# Patient Record
Sex: MALE | Race: WHITE | NOT HISPANIC OR LATINO | Employment: OTHER | URBAN - METROPOLITAN AREA
[De-identification: names, ages, dates, MRNs, and addresses within clinical notes are randomized per-mention and may not be internally consistent; named-entity substitution may affect disease eponyms.]

---

## 2017-01-11 ENCOUNTER — PREPPED CHART (OUTPATIENT)
Dept: URBAN - METROPOLITAN AREA CLINIC 39 | Facility: CLINIC | Age: 77
End: 2017-01-11

## 2017-03-03 ASSESSMENT — VISUAL ACUITY
OS_SC: 20/400
OD_SC: 20/30-1

## 2017-03-03 ASSESSMENT — TONOMETRY: OD_IOP_MMHG: 14

## 2017-03-17 ENCOUNTER — ESTABLISHED PATIENT (OUTPATIENT)
Dept: URBAN - METROPOLITAN AREA CLINIC 43 | Facility: CLINIC | Age: 77
End: 2017-03-17

## 2017-03-17 DIAGNOSIS — H35.3221: ICD-10-CM

## 2017-03-17 DIAGNOSIS — H40.1132: ICD-10-CM

## 2017-03-17 DIAGNOSIS — H35.3211: ICD-10-CM

## 2017-03-17 DIAGNOSIS — Z96.1: ICD-10-CM

## 2017-03-17 PROCEDURE — G8428 CUR MEDS NOT DOCUMENT: HCPCS

## 2017-03-17 PROCEDURE — 4177F TALK PT/CRGVR RE AREDS PREV: CPT

## 2017-03-17 PROCEDURE — G8785 BP SCRN NO PERF AT INTERVAL: HCPCS

## 2017-03-17 PROCEDURE — 2019F DILATED MACUL EXAM DONE: CPT

## 2017-03-17 PROCEDURE — 0517F GLAUCOMA PLAN OF CARE DOCD: CPT

## 2017-03-17 PROCEDURE — 2027F OPTIC NERVE HEAD EVAL DONE: CPT

## 2017-03-17 PROCEDURE — 92134 CPTRZ OPH DX IMG PST SGM RTA: CPT

## 2017-03-17 PROCEDURE — 92012 INTRM OPH EXAM EST PATIENT: CPT

## 2017-03-17 PROCEDURE — 1036F TOBACCO NON-USER: CPT

## 2017-03-17 ASSESSMENT — VISUAL ACUITY
OD_SC: 20/25-1
OS_SC: 20/400

## 2017-03-17 ASSESSMENT — TONOMETRY
OD_IOP_MMHG: 14
OS_IOP_MMHG: 15

## 2017-03-29 ENCOUNTER — CLINIC PROCEDURE ONLY (OUTPATIENT)
Dept: URBAN - METROPOLITAN AREA CLINIC 43 | Facility: CLINIC | Age: 77
End: 2017-03-29

## 2017-03-29 DIAGNOSIS — H35.3211: ICD-10-CM

## 2017-03-29 PROCEDURE — J3490AVA AVASTIN

## 2017-03-29 PROCEDURE — 67028 INJECTION EYE DRUG: CPT

## 2017-03-29 ASSESSMENT — VISUAL ACUITY
OS_SC: 20/400
OD_SC: 20/25-2

## 2017-03-29 ASSESSMENT — TONOMETRY: OD_IOP_MMHG: 12

## 2017-05-15 ENCOUNTER — ESTABLISHED PATIENT (OUTPATIENT)
Dept: URBAN - METROPOLITAN AREA CLINIC 43 | Facility: CLINIC | Age: 77
End: 2017-05-15

## 2017-05-15 DIAGNOSIS — H35.3221: ICD-10-CM

## 2017-05-15 DIAGNOSIS — Z96.1: ICD-10-CM

## 2017-05-15 DIAGNOSIS — H40.1132: ICD-10-CM

## 2017-05-15 DIAGNOSIS — H35.3211: ICD-10-CM

## 2017-05-15 DIAGNOSIS — E11.3293: ICD-10-CM

## 2017-05-15 PROCEDURE — 92134 CPTRZ OPH DX IMG PST SGM RTA: CPT

## 2017-05-15 PROCEDURE — 4040F PNEUMOC VAC/ADMIN/RCVD: CPT

## 2017-05-15 PROCEDURE — G8482 FLU IMMUNIZE ORDER/ADMIN: HCPCS

## 2017-05-15 PROCEDURE — 2021F DILAT MACULAR EXAM DONE: CPT

## 2017-05-15 PROCEDURE — 92012 INTRM OPH EXAM EST PATIENT: CPT

## 2017-05-15 PROCEDURE — 0517F GLAUCOMA PLAN OF CARE DOCD: CPT

## 2017-05-15 PROCEDURE — 4177F TALK PT/CRGVR RE AREDS PREV: CPT

## 2017-05-15 PROCEDURE — 9222650 BILAT EXTENDED OPHTHALMOSCOPY, F/U

## 2017-05-15 PROCEDURE — 5010F MACUL RESULT PHY/QHP MNG DM: CPT

## 2017-05-15 PROCEDURE — 2019F DILATED MACUL EXAM DONE: CPT

## 2017-05-15 PROCEDURE — G8785 BP SCRN NO PERF AT INTERVAL: HCPCS

## 2017-05-15 PROCEDURE — 1036F TOBACCO NON-USER: CPT

## 2017-05-15 PROCEDURE — 3285F IOP DOWN <15% OF PRE-SVC LVL: CPT

## 2017-05-15 PROCEDURE — 2027F OPTIC NERVE HEAD EVAL DONE: CPT

## 2017-05-15 PROCEDURE — G8428 CUR MEDS NOT DOCUMENT: HCPCS

## 2017-05-15 PROCEDURE — 2022F DILAT RTA XM EVC RTNOPTHY: CPT

## 2017-05-15 PROCEDURE — G8397 DIL MACULA/FUNDUS EXAM/W DOC: HCPCS

## 2017-05-15 ASSESSMENT — VISUAL ACUITY
OD_SC: 20/30-2
OS_SC: 20/400

## 2017-05-15 ASSESSMENT — TONOMETRY
OD_IOP_MMHG: 12
OS_IOP_MMHG: 14

## 2017-06-07 ENCOUNTER — ESTABLISHED PATIENT (OUTPATIENT)
Dept: URBAN - METROPOLITAN AREA CLINIC 43 | Facility: CLINIC | Age: 77
End: 2017-06-07

## 2017-06-07 DIAGNOSIS — H35.3211: ICD-10-CM

## 2017-06-07 PROCEDURE — 67028 INJECTION EYE DRUG: CPT

## 2017-06-07 PROCEDURE — J3490AVA AVASTIN

## 2017-06-07 ASSESSMENT — TONOMETRY: OD_IOP_MMHG: 12

## 2017-06-07 ASSESSMENT — VISUAL ACUITY
OD_SC: 20/25-1
OS_SC: 20/400

## 2017-07-21 ENCOUNTER — ESTABLISHED PATIENT (OUTPATIENT)
Dept: URBAN - METROPOLITAN AREA CLINIC 43 | Facility: CLINIC | Age: 77
End: 2017-07-21

## 2017-07-21 DIAGNOSIS — H35.3221: ICD-10-CM

## 2017-07-21 DIAGNOSIS — H40.1132: ICD-10-CM

## 2017-07-21 DIAGNOSIS — E11.3293: ICD-10-CM

## 2017-07-21 DIAGNOSIS — H35.3211: ICD-10-CM

## 2017-07-21 PROCEDURE — 92012 INTRM OPH EXAM EST PATIENT: CPT

## 2017-07-21 PROCEDURE — G8428 CUR MEDS NOT DOCUMENT: HCPCS

## 2017-07-21 PROCEDURE — G8397 DIL MACULA/FUNDUS EXAM/W DOC: HCPCS

## 2017-07-21 PROCEDURE — 2022F DILAT RTA XM EVC RTNOPTHY: CPT

## 2017-07-21 PROCEDURE — G8756 NO BP MEASURE DOC: HCPCS

## 2017-07-21 PROCEDURE — 2019F DILATED MACUL EXAM DONE: CPT

## 2017-07-21 PROCEDURE — 4177F TALK PT/CRGVR RE AREDS PREV: CPT

## 2017-07-21 PROCEDURE — 2021F DILAT MACULAR EXAM DONE: CPT

## 2017-07-21 PROCEDURE — 9222650 BILAT EXTENDED OPHTHALMOSCOPY, F/U

## 2017-07-21 PROCEDURE — 5010F MACUL RESULT PHY/QHP MNG DM: CPT

## 2017-07-21 PROCEDURE — 1036F TOBACCO NON-USER: CPT

## 2017-07-21 PROCEDURE — 92134 CPTRZ OPH DX IMG PST SGM RTA: CPT

## 2017-07-21 ASSESSMENT — TONOMETRY: OS_IOP_MMHG: 10

## 2017-07-21 ASSESSMENT — VISUAL ACUITY
OD_SC: 20/30-1
OS_SC: 20/400

## 2017-08-16 ENCOUNTER — ESTABLISHED PATIENT (OUTPATIENT)
Dept: URBAN - METROPOLITAN AREA CLINIC 43 | Facility: CLINIC | Age: 77
End: 2017-08-16

## 2017-08-16 DIAGNOSIS — H35.3212: ICD-10-CM

## 2017-08-16 PROCEDURE — 67028 INJECTION EYE DRUG: CPT

## 2017-08-16 PROCEDURE — J3490AVA AVASTIN

## 2017-08-16 ASSESSMENT — VISUAL ACUITY
OD_SC: 20/30+2
OS_SC: 20/400

## 2017-08-16 ASSESSMENT — TONOMETRY: OD_IOP_MMHG: 14

## 2017-10-13 ENCOUNTER — ESTABLISHED PATIENT (OUTPATIENT)
Dept: URBAN - METROPOLITAN AREA CLINIC 43 | Facility: CLINIC | Age: 77
End: 2017-10-13

## 2017-10-13 DIAGNOSIS — H35.3221: ICD-10-CM

## 2017-10-13 DIAGNOSIS — H35.3212: ICD-10-CM

## 2017-10-13 DIAGNOSIS — E11.3293: ICD-10-CM

## 2017-10-13 PROCEDURE — G8427 DOCREV CUR MEDS BY ELIG CLIN: HCPCS

## 2017-10-13 PROCEDURE — 92012 INTRM OPH EXAM EST PATIENT: CPT

## 2017-10-13 PROCEDURE — G8397 DIL MACULA/FUNDUS EXAM/W DOC: HCPCS

## 2017-10-13 PROCEDURE — G8756 NO BP MEASURE DOC: HCPCS

## 2017-10-13 PROCEDURE — 1036F TOBACCO NON-USER: CPT

## 2017-10-13 PROCEDURE — 2019F DILATED MACUL EXAM DONE: CPT

## 2017-10-13 PROCEDURE — 5010F MACUL RESULT PHY/QHP MNG DM: CPT

## 2017-10-13 PROCEDURE — 2022F DILAT RTA XM EVC RTNOPTHY: CPT

## 2017-10-13 PROCEDURE — 4177F TALK PT/CRGVR RE AREDS PREV: CPT

## 2017-10-13 PROCEDURE — 92134 CPTRZ OPH DX IMG PST SGM RTA: CPT

## 2017-10-13 ASSESSMENT — TONOMETRY
OD_IOP_MMHG: 12
OS_IOP_MMHG: 10

## 2017-10-13 ASSESSMENT — VISUAL ACUITY
OD_SC: 20/30-1+2
OS_SC: CF 2FT

## 2017-10-25 ENCOUNTER — ESTABLISHED PATIENT (OUTPATIENT)
Dept: URBAN - METROPOLITAN AREA CLINIC 43 | Facility: CLINIC | Age: 77
End: 2017-10-25

## 2017-10-25 DIAGNOSIS — H35.3212: ICD-10-CM

## 2017-10-25 PROCEDURE — 67028 INJECTION EYE DRUG: CPT

## 2017-10-25 PROCEDURE — J3490AVA AVASTIN

## 2017-10-25 ASSESSMENT — VISUAL ACUITY
OS_SC: 20/400
OD_SC: 20/30+2

## 2017-10-25 ASSESSMENT — TONOMETRY: OD_IOP_MMHG: 14

## 2017-12-22 ENCOUNTER — ESTABLISHED PATIENT (OUTPATIENT)
Dept: URBAN - METROPOLITAN AREA CLINIC 43 | Facility: CLINIC | Age: 77
End: 2017-12-22

## 2017-12-22 DIAGNOSIS — E11.3293: ICD-10-CM

## 2017-12-22 DIAGNOSIS — H40.1132: ICD-10-CM

## 2017-12-22 DIAGNOSIS — H35.3221: ICD-10-CM

## 2017-12-22 DIAGNOSIS — Z96.1: ICD-10-CM

## 2017-12-22 DIAGNOSIS — H35.3212: ICD-10-CM

## 2017-12-22 PROCEDURE — 2022F DILAT RTA XM EVC RTNOPTHY: CPT

## 2017-12-22 PROCEDURE — G8756 NO BP MEASURE DOC: HCPCS

## 2017-12-22 PROCEDURE — 3284F IOP RED >=15% PRE-NTRV LVL: CPT

## 2017-12-22 PROCEDURE — 5010F MACUL RESULT PHY/QHP MNG DM: CPT

## 2017-12-22 PROCEDURE — 1036F TOBACCO NON-USER: CPT

## 2017-12-22 PROCEDURE — 9222650 BILAT EXTENDED OPHTHALMOSCOPY, F/U

## 2017-12-22 PROCEDURE — 2027F OPTIC NERVE HEAD EVAL DONE: CPT

## 2017-12-22 PROCEDURE — 2019F DILATED MACUL EXAM DONE: CPT

## 2017-12-22 PROCEDURE — 92134 CPTRZ OPH DX IMG PST SGM RTA: CPT

## 2017-12-22 PROCEDURE — G8428 CUR MEDS NOT DOCUMENT: HCPCS

## 2017-12-22 PROCEDURE — 92012 INTRM OPH EXAM EST PATIENT: CPT

## 2017-12-22 PROCEDURE — 4177F TALK PT/CRGVR RE AREDS PREV: CPT

## 2017-12-22 PROCEDURE — G8397 DIL MACULA/FUNDUS EXAM/W DOC: HCPCS

## 2017-12-22 ASSESSMENT — VISUAL ACUITY
OS_SC: 20/400
OD_SC: 20/30-2

## 2017-12-22 ASSESSMENT — TONOMETRY
OS_IOP_MMHG: 13
OD_IOP_MMHG: 10

## 2018-01-12 NOTE — PATIENT DISCUSSION
(H40.5393) Primary open-angle glaucoma right eye mild stage - Assesment : Examination revealed Primary Open Angle Glaucoma. IOP 21/14 today. Baseline OCT ONH today. s/p SLT OD  s/p Trab OS Pt reports taking Lumigan qhs OD and Neptazane 50mg bid PO. Pt states has about 6 days of Neptazane left. Pt reports IOP usually runs around 20 OD. Pt follows up Jasiel and next appt is in Feb.   Last visit from Dr. Frank Soler reviewed, will scan into EHR. - Plan : Monitor for IOP and NFL changes with visits and testing. Continue Lumigan qhs OS and finish Neptazane. RTC here in 1-2 weeks for IOP Check to eval IOP off Neptazane.

## 2018-01-12 NOTE — PATIENT DISCUSSION
(S94.3590) Primary open-angle glaucoma left eye severe stage - Assesment : Examination revealed Primary Open Angle Glaucoma. IOP 21/14 today. Baseline OCT ONH today. s/p SLT OD  s/p Trab OS Pt reports taking Lumigan qhs OD and Neptazane 50mg bid PO. Pt states has about 6 days of Neptazane left. Pt reports IOP usually runs around 20 OD. Pt follows up Christofer Pena and next appt is in Feb.   Last visit from Dr. Maggy Huston reviewed, will scan into EHR. - Plan : Monitor for IOP and NFL changes with visits and testing. Continue Lumigan qhs OS and finish Neptazane. RTC here in 1-2 weeks for IOP Check to eval IOP off Neptazane.

## 2018-01-24 ENCOUNTER — ESTABLISHED PATIENT (OUTPATIENT)
Dept: URBAN - METROPOLITAN AREA CLINIC 43 | Facility: CLINIC | Age: 78
End: 2018-01-24

## 2018-01-24 DIAGNOSIS — H35.3212: ICD-10-CM

## 2018-01-24 PROCEDURE — J3490AVA AVASTIN

## 2018-01-24 PROCEDURE — 67028 INJECTION EYE DRUG: CPT

## 2018-01-24 ASSESSMENT — VISUAL ACUITY
OD_SC: 20/30-2
OS_SC: CF 6FT

## 2018-01-24 ASSESSMENT — TONOMETRY: OD_IOP_MMHG: 14

## 2018-01-25 NOTE — PATIENT DISCUSSION
(H11.32) Conjunctival hemorrhage, left eye - Assesment : Examination revealed subconjunctival hemorrhage nasally in the left eye. - Plan : Reassured patient and use AT's as needed.

## 2018-01-25 NOTE — PATIENT DISCUSSION
(H40.1111) Primary open-angle glaucoma right eye mild stage - Assesment : Examination revealed Primary Open Angle Glaucoma. IOP 20/12 today. IOP stable off of neptazane s/p SLT OD  s/p Trab OS - Plan : Monitor for IOP and NFL changes with visits and testing. Continue Lumigan qhs OD  Stop Neptazane (patient doing well off medication)  Return as needed as patient is leaving [facility] next week. Pt follows with Dr. Eryn Mari and will send letter.

## 2018-02-09 ENCOUNTER — ESTABLISHED COMPREHENSIVE EXAM (OUTPATIENT)
Dept: URBAN - METROPOLITAN AREA CLINIC 46 | Facility: CLINIC | Age: 78
End: 2018-02-09

## 2018-02-09 DIAGNOSIS — H40.1132: ICD-10-CM

## 2018-02-09 DIAGNOSIS — H35.3212: ICD-10-CM

## 2018-02-09 DIAGNOSIS — H35.3221: ICD-10-CM

## 2018-02-09 DIAGNOSIS — E11.3293: ICD-10-CM

## 2018-02-09 PROCEDURE — 2027F OPTIC NERVE HEAD EVAL DONE: CPT

## 2018-02-09 PROCEDURE — G8427 DOCREV CUR MEDS BY ELIG CLIN: HCPCS

## 2018-02-09 PROCEDURE — 9222650 BILAT EXTENDED OPHTHALMOSCOPY, F/U

## 2018-02-09 PROCEDURE — 1036F TOBACCO NON-USER: CPT

## 2018-02-09 PROCEDURE — G8756 NO BP MEASURE DOC: HCPCS

## 2018-02-09 PROCEDURE — 92250 FUNDUS PHOTOGRAPHY W/I&R: CPT

## 2018-02-09 PROCEDURE — 92014 COMPRE OPH EXAM EST PT 1/>: CPT

## 2018-02-09 PROCEDURE — 3285F IOP DOWN <15% OF PRE-SVC LVL: CPT

## 2018-02-09 PROCEDURE — 0517F GLAUCOMA PLAN OF CARE DOCD: CPT

## 2018-02-09 RX ORDER — BRINZOLAMIDE 10 MG/ML
1 SUSPENSION/ DROPS OPHTHALMIC TWICE A DAY
Start: 2018-02-09

## 2018-02-09 ASSESSMENT — TONOMETRY
OD_IOP_MMHG: 09
OS_IOP_MMHG: 09

## 2018-02-09 ASSESSMENT — VISUAL ACUITY
OS_SC: 20/400
OD_CC: J2
OD_SC: 20/25

## 2018-03-23 ENCOUNTER — ESTABLISHED PATIENT (OUTPATIENT)
Dept: URBAN - METROPOLITAN AREA CLINIC 43 | Facility: CLINIC | Age: 78
End: 2018-03-23

## 2018-03-23 DIAGNOSIS — E11.3293: ICD-10-CM

## 2018-03-23 DIAGNOSIS — H35.3212: ICD-10-CM

## 2018-03-23 DIAGNOSIS — H35.3221: ICD-10-CM

## 2018-03-23 PROCEDURE — 2022F DILAT RTA XM EVC RTNOPTHY: CPT

## 2018-03-23 PROCEDURE — 92012 INTRM OPH EXAM EST PATIENT: CPT

## 2018-03-23 PROCEDURE — G8397 DIL MACULA/FUNDUS EXAM/W DOC: HCPCS

## 2018-03-23 PROCEDURE — 9222650 BILAT EXTENDED OPHTHALMOSCOPY, F/U

## 2018-03-23 PROCEDURE — 4177F TALK PT/CRGVR RE AREDS PREV: CPT

## 2018-03-23 PROCEDURE — 1036F TOBACCO NON-USER: CPT

## 2018-03-23 PROCEDURE — 5010F MACUL RESULT PHY/QHP MNG DM: CPT

## 2018-03-23 PROCEDURE — G8756 NO BP MEASURE DOC: HCPCS

## 2018-03-23 PROCEDURE — G8427 DOCREV CUR MEDS BY ELIG CLIN: HCPCS

## 2018-03-23 PROCEDURE — 92134 CPTRZ OPH DX IMG PST SGM RTA: CPT

## 2018-03-23 PROCEDURE — 2019F DILATED MACUL EXAM DONE: CPT

## 2018-03-23 ASSESSMENT — TONOMETRY
OD_IOP_MMHG: 14
OS_IOP_MMHG: 14

## 2018-03-23 ASSESSMENT — VISUAL ACUITY
OS_SC: 20/400
OD_SC: 20/25-3

## 2018-03-30 ENCOUNTER — ESTABLISHED PATIENT (OUTPATIENT)
Dept: URBAN - METROPOLITAN AREA CLINIC 46 | Facility: CLINIC | Age: 78
End: 2018-03-30

## 2018-03-30 DIAGNOSIS — H40.1132: ICD-10-CM

## 2018-03-30 DIAGNOSIS — H35.3212: ICD-10-CM

## 2018-03-30 DIAGNOSIS — E11.3293: ICD-10-CM

## 2018-03-30 DIAGNOSIS — H35.3221: ICD-10-CM

## 2018-03-30 PROCEDURE — 2019F DILATED MACUL EXAM DONE: CPT

## 2018-03-30 PROCEDURE — 4177F TALK PT/CRGVR RE AREDS PREV: CPT

## 2018-03-30 PROCEDURE — 5010F MACUL RESULT PHY/QHP MNG DM: CPT

## 2018-03-30 PROCEDURE — 1036F TOBACCO NON-USER: CPT

## 2018-03-30 PROCEDURE — G8756 NO BP MEASURE DOC: HCPCS

## 2018-03-30 PROCEDURE — 2027F OPTIC NERVE HEAD EVAL DONE: CPT

## 2018-03-30 PROCEDURE — G8427 DOCREV CUR MEDS BY ELIG CLIN: HCPCS

## 2018-03-30 PROCEDURE — 92012 INTRM OPH EXAM EST PATIENT: CPT

## 2018-03-30 PROCEDURE — 2022F DILAT RTA XM EVC RTNOPTHY: CPT

## 2018-03-30 PROCEDURE — 3285F IOP DOWN <15% OF PRE-SVC LVL: CPT

## 2018-03-30 PROCEDURE — G8397 DIL MACULA/FUNDUS EXAM/W DOC: HCPCS

## 2018-03-30 PROCEDURE — 0517F GLAUCOMA PLAN OF CARE DOCD: CPT

## 2018-03-30 ASSESSMENT — TONOMETRY
OS_IOP_MMHG: 11
OD_IOP_MMHG: 12

## 2018-03-30 ASSESSMENT — VISUAL ACUITY
OS_SC: 20/400
OD_SC: 20/25

## 2018-04-11 ENCOUNTER — CLINIC PROCEDURE ONLY (OUTPATIENT)
Dept: URBAN - METROPOLITAN AREA CLINIC 43 | Facility: CLINIC | Age: 78
End: 2018-04-11

## 2018-04-11 DIAGNOSIS — H35.3212: ICD-10-CM

## 2018-04-11 PROCEDURE — 67028 INJECTION EYE DRUG: CPT

## 2018-04-11 PROCEDURE — J3490AVA AVASTIN

## 2018-04-11 ASSESSMENT — TONOMETRY: OD_IOP_MMHG: 13

## 2018-04-11 ASSESSMENT — VISUAL ACUITY
OD_SC: 20/30-1+1
OS_SC: 20/400

## 2018-06-15 ENCOUNTER — ESTABLISHED PATIENT (OUTPATIENT)
Dept: URBAN - METROPOLITAN AREA CLINIC 43 | Facility: CLINIC | Age: 78
End: 2018-06-15

## 2018-06-15 DIAGNOSIS — E11.3293: ICD-10-CM

## 2018-06-15 DIAGNOSIS — H35.3221: ICD-10-CM

## 2018-06-15 DIAGNOSIS — H35.3212: ICD-10-CM

## 2018-06-15 PROCEDURE — 9222650 BILAT EXTENDED OPHTHALMOSCOPY, F/U

## 2018-06-15 PROCEDURE — 92134 CPTRZ OPH DX IMG PST SGM RTA: CPT

## 2018-06-15 PROCEDURE — 4177F TALK PT/CRGVR RE AREDS PREV: CPT

## 2018-06-15 PROCEDURE — G8427 DOCREV CUR MEDS BY ELIG CLIN: HCPCS

## 2018-06-15 PROCEDURE — 92012 INTRM OPH EXAM EST PATIENT: CPT

## 2018-06-15 PROCEDURE — G8756 NO BP MEASURE DOC: HCPCS

## 2018-06-15 PROCEDURE — 2022F DILAT RTA XM EVC RTNOPTHY: CPT

## 2018-06-15 PROCEDURE — 2019F DILATED MACUL EXAM DONE: CPT

## 2018-06-15 PROCEDURE — 5010F MACUL RESULT PHY/QHP MNG DM: CPT

## 2018-06-15 PROCEDURE — 1036F TOBACCO NON-USER: CPT

## 2018-06-15 PROCEDURE — G8397 DIL MACULA/FUNDUS EXAM/W DOC: HCPCS

## 2018-06-15 ASSESSMENT — TONOMETRY
OD_IOP_MMHG: 13
OS_IOP_MMHG: 11

## 2018-06-15 ASSESSMENT — VISUAL ACUITY
OS_SC: 20/400
OD_SC: 20/25-3

## 2018-07-11 ENCOUNTER — ESTABLISHED PATIENT (OUTPATIENT)
Dept: URBAN - METROPOLITAN AREA CLINIC 43 | Facility: CLINIC | Age: 78
End: 2018-07-11

## 2018-07-11 DIAGNOSIS — H35.3212: ICD-10-CM

## 2018-07-11 PROCEDURE — J3490AVA AVASTIN

## 2018-07-11 PROCEDURE — 67028 INJECTION EYE DRUG: CPT

## 2018-07-11 ASSESSMENT — TONOMETRY: OD_IOP_MMHG: 12

## 2018-07-11 ASSESSMENT — VISUAL ACUITY
OD_CC: 20/30-1
OS_CC: 20/400

## 2018-07-27 ENCOUNTER — IOP CHECK (OUTPATIENT)
Dept: URBAN - METROPOLITAN AREA CLINIC 46 | Facility: CLINIC | Age: 78
End: 2018-07-27

## 2018-07-27 DIAGNOSIS — H40.1132: ICD-10-CM

## 2018-07-27 PROCEDURE — 76514 ECHO EXAM OF EYE THICKNESS: CPT

## 2018-07-27 PROCEDURE — 92133 CPTRZD OPH DX IMG PST SGM ON: CPT

## 2018-07-27 PROCEDURE — 92012 INTRM OPH EXAM EST PATIENT: CPT

## 2018-07-27 PROCEDURE — G8428 CUR MEDS NOT DOCUMENT: HCPCS

## 2018-07-27 PROCEDURE — 0517F GLAUCOMA PLAN OF CARE DOCD: CPT

## 2018-07-27 PROCEDURE — 2027F OPTIC NERVE HEAD EVAL DONE: CPT

## 2018-07-27 PROCEDURE — G8756 NO BP MEASURE DOC: HCPCS

## 2018-07-27 PROCEDURE — 1036F TOBACCO NON-USER: CPT

## 2018-07-27 PROCEDURE — 3285F IOP DOWN <15% OF PRE-SVC LVL: CPT

## 2018-07-27 ASSESSMENT — PACHYMETRY
OS_CT_UM: 546
OD_CT_UM: 558

## 2018-07-27 ASSESSMENT — TONOMETRY
OS_IOP_MMHG: 15
OD_IOP_MMHG: 14

## 2018-07-27 ASSESSMENT — VISUAL ACUITY
OS_SC: 20/400 EF
OD_SC: 20/25-1

## 2018-09-14 ENCOUNTER — ESTABLISHED PATIENT (OUTPATIENT)
Dept: URBAN - METROPOLITAN AREA CLINIC 43 | Facility: CLINIC | Age: 78
End: 2018-09-14

## 2018-09-14 DIAGNOSIS — E11.3293: ICD-10-CM

## 2018-09-14 DIAGNOSIS — H35.3212: ICD-10-CM

## 2018-09-14 DIAGNOSIS — H35.3221: ICD-10-CM

## 2018-09-14 PROCEDURE — 4177F TALK PT/CRGVR RE AREDS PREV: CPT

## 2018-09-14 PROCEDURE — G8428 CUR MEDS NOT DOCUMENT: HCPCS

## 2018-09-14 PROCEDURE — 92012 INTRM OPH EXAM EST PATIENT: CPT

## 2018-09-14 PROCEDURE — 9222650 BILAT EXTENDED OPHTHALMOSCOPY, F/U

## 2018-09-14 PROCEDURE — 1036F TOBACCO NON-USER: CPT

## 2018-09-14 PROCEDURE — 92134 CPTRZ OPH DX IMG PST SGM RTA: CPT

## 2018-09-14 PROCEDURE — G8756 NO BP MEASURE DOC: HCPCS

## 2018-09-14 PROCEDURE — 2019F DILATED MACUL EXAM DONE: CPT

## 2018-09-14 PROCEDURE — 2022F DILAT RTA XM EVC RTNOPTHY: CPT

## 2018-09-14 PROCEDURE — G9903 PT SCRN TBCO ID AS NON USER: HCPCS

## 2018-09-14 PROCEDURE — G9974 MAC EXAM PERF: HCPCS

## 2018-09-14 PROCEDURE — G8397 DIL MACULA/FUNDUS EXAM/W DOC: HCPCS

## 2018-09-14 ASSESSMENT — VISUAL ACUITY
OS_SC: 20/400
OD_SC: 20/25

## 2018-09-14 ASSESSMENT — TONOMETRY
OD_IOP_MMHG: 14
OS_IOP_MMHG: 15

## 2018-10-03 ENCOUNTER — CLINIC PROCEDURE ONLY (OUTPATIENT)
Dept: URBAN - METROPOLITAN AREA CLINIC 43 | Facility: CLINIC | Age: 78
End: 2018-10-03

## 2018-10-03 DIAGNOSIS — H35.3212: ICD-10-CM

## 2018-10-03 PROCEDURE — 67028 INJECTION EYE DRUG: CPT

## 2018-10-03 PROCEDURE — J3490AVA AVASTIN

## 2018-10-03 ASSESSMENT — VISUAL ACUITY
OD_SC: 20/30-2
OS_SC: 20/400

## 2018-10-03 ASSESSMENT — TONOMETRY: OD_IOP_MMHG: 15

## 2018-12-06 ENCOUNTER — ESTABLISHED PATIENT (OUTPATIENT)
Dept: URBAN - METROPOLITAN AREA CLINIC 43 | Facility: CLINIC | Age: 78
End: 2018-12-06

## 2018-12-06 DIAGNOSIS — H35.3221: ICD-10-CM

## 2018-12-06 DIAGNOSIS — H35.3212: ICD-10-CM

## 2018-12-06 DIAGNOSIS — E11.3293: ICD-10-CM

## 2018-12-06 PROCEDURE — 1036F TOBACCO NON-USER: CPT

## 2018-12-06 PROCEDURE — 5010F MACUL RESULT PHY/QHP MNG DM: CPT

## 2018-12-06 PROCEDURE — G8427 DOCREV CUR MEDS BY ELIG CLIN: HCPCS

## 2018-12-06 PROCEDURE — 2022F DILAT RTA XM EVC RTNOPTHY: CPT

## 2018-12-06 PROCEDURE — G9903 PT SCRN TBCO ID AS NON USER: HCPCS

## 2018-12-06 PROCEDURE — 9222650 BILAT EXTENDED OPHTHALMOSCOPY, F/U

## 2018-12-06 PROCEDURE — 2019F DILATED MACUL EXAM DONE: CPT

## 2018-12-06 PROCEDURE — 92012 INTRM OPH EXAM EST PATIENT: CPT

## 2018-12-06 PROCEDURE — 4177F TALK PT/CRGVR RE AREDS PREV: CPT

## 2018-12-06 PROCEDURE — 92134 CPTRZ OPH DX IMG PST SGM RTA: CPT

## 2018-12-06 PROCEDURE — G8756 NO BP MEASURE DOC: HCPCS

## 2018-12-06 PROCEDURE — G8397 DIL MACULA/FUNDUS EXAM/W DOC: HCPCS

## 2018-12-06 ASSESSMENT — TONOMETRY
OD_IOP_MMHG: 13
OS_IOP_MMHG: 14

## 2018-12-06 ASSESSMENT — VISUAL ACUITY
OS_SC: 20/400
OD_SC: 20/30-2

## 2018-12-14 ENCOUNTER — IOP CHECK (OUTPATIENT)
Dept: URBAN - METROPOLITAN AREA CLINIC 46 | Facility: CLINIC | Age: 78
End: 2018-12-14

## 2018-12-14 DIAGNOSIS — Z96.1: ICD-10-CM

## 2018-12-14 DIAGNOSIS — H40.1132: ICD-10-CM

## 2018-12-14 PROCEDURE — 3285F IOP DOWN <15% OF PRE-SVC LVL: CPT

## 2018-12-14 PROCEDURE — G8428 CUR MEDS NOT DOCUMENT: HCPCS

## 2018-12-14 PROCEDURE — G8756 NO BP MEASURE DOC: HCPCS

## 2018-12-14 PROCEDURE — 92083 EXTENDED VISUAL FIELD XM: CPT

## 2018-12-14 PROCEDURE — 1036F TOBACCO NON-USER: CPT

## 2018-12-14 PROCEDURE — 92012 INTRM OPH EXAM EST PATIENT: CPT

## 2018-12-14 PROCEDURE — G9903 PT SCRN TBCO ID AS NON USER: HCPCS

## 2018-12-14 PROCEDURE — 2027F OPTIC NERVE HEAD EVAL DONE: CPT

## 2018-12-14 PROCEDURE — 0517F GLAUCOMA PLAN OF CARE DOCD: CPT

## 2018-12-14 ASSESSMENT — VISUAL ACUITY
OD_SC: 20/40+1
OS_SC: CF 5FT

## 2018-12-14 ASSESSMENT — TONOMETRY
OS_IOP_MMHG: 13
OD_IOP_MMHG: 12

## 2018-12-19 ENCOUNTER — CLINIC PROCEDURE ONLY (OUTPATIENT)
Dept: URBAN - METROPOLITAN AREA CLINIC 43 | Facility: CLINIC | Age: 78
End: 2018-12-19

## 2018-12-19 DIAGNOSIS — H35.3212: ICD-10-CM

## 2018-12-19 PROCEDURE — 67028 INJECTION EYE DRUG: CPT

## 2018-12-19 PROCEDURE — J3490AVA AVASTIN

## 2018-12-19 ASSESSMENT — VISUAL ACUITY
OD_SC: 20/30-2
OS_SC: 20/400

## 2018-12-19 ASSESSMENT — TONOMETRY: OD_IOP_MMHG: 12

## 2019-01-24 ENCOUNTER — ESTABLISHED PATIENT (OUTPATIENT)
Dept: URBAN - METROPOLITAN AREA CLINIC 43 | Facility: CLINIC | Age: 79
End: 2019-01-24

## 2019-01-24 DIAGNOSIS — H02.005: ICD-10-CM

## 2019-01-24 DIAGNOSIS — H02.055: ICD-10-CM

## 2019-01-24 PROCEDURE — 99213 OFFICE O/P EST LOW 20 MIN: CPT

## 2019-01-24 PROCEDURE — G8428 CUR MEDS NOT DOCUMENT: HCPCS

## 2019-01-24 PROCEDURE — 67921 REPAIR EYELID DEFECT: CPT

## 2019-01-24 PROCEDURE — 1036F TOBACCO NON-USER: CPT

## 2019-01-24 PROCEDURE — G9903 PT SCRN TBCO ID AS NON USER: HCPCS

## 2019-01-24 PROCEDURE — 4040F PNEUMOC VAC/ADMIN/RCVD: CPT

## 2019-01-24 PROCEDURE — 92285 EXTERNAL OCULAR PHOTOGRAPHY: CPT

## 2019-01-24 ASSESSMENT — VISUAL ACUITY: OD_SC: 20/25-1

## 2019-03-01 ENCOUNTER — ESTABLISHED PATIENT (OUTPATIENT)
Dept: URBAN - METROPOLITAN AREA CLINIC 43 | Facility: CLINIC | Age: 79
End: 2019-03-01

## 2019-03-01 DIAGNOSIS — H35.3212: ICD-10-CM

## 2019-03-01 DIAGNOSIS — H35.3221: ICD-10-CM

## 2019-03-01 DIAGNOSIS — E11.3293: ICD-10-CM

## 2019-03-01 PROCEDURE — 92012 INTRM OPH EXAM EST PATIENT: CPT

## 2019-03-01 PROCEDURE — 92134 CPTRZ OPH DX IMG PST SGM RTA: CPT

## 2019-03-01 PROCEDURE — 9222650 BILAT EXTENDED OPHTHALMOSCOPY, F/U

## 2019-03-01 ASSESSMENT — VISUAL ACUITY
OD_SC: 20/30
OS_SC: 20/400

## 2019-03-01 ASSESSMENT — TONOMETRY
OS_IOP_MMHG: 12
OD_IOP_MMHG: 10

## 2019-03-20 ENCOUNTER — CLINIC PROCEDURE ONLY (OUTPATIENT)
Dept: URBAN - METROPOLITAN AREA CLINIC 43 | Facility: CLINIC | Age: 79
End: 2019-03-20

## 2019-03-20 VITALS — DIASTOLIC BLOOD PRESSURE: 60 MMHG | HEART RATE: 64 BPM | SYSTOLIC BLOOD PRESSURE: 106 MMHG | HEIGHT: 60 IN

## 2019-03-20 DIAGNOSIS — H35.3212: ICD-10-CM

## 2019-03-20 PROCEDURE — J3490AVA AVASTIN

## 2019-03-20 PROCEDURE — 67028 INJECTION EYE DRUG: CPT

## 2019-03-20 ASSESSMENT — TONOMETRY: OD_IOP_MMHG: 11

## 2019-03-20 ASSESSMENT — VISUAL ACUITY
OS_SC: 20/400
OD_SC: 20/30-1

## 2019-05-31 ENCOUNTER — ESTABLISHED PATIENT (OUTPATIENT)
Dept: URBAN - METROPOLITAN AREA CLINIC 43 | Facility: CLINIC | Age: 79
End: 2019-05-31

## 2019-05-31 DIAGNOSIS — H35.3212: ICD-10-CM

## 2019-05-31 DIAGNOSIS — H35.3221: ICD-10-CM

## 2019-05-31 DIAGNOSIS — E11.3293: ICD-10-CM

## 2019-05-31 PROCEDURE — 9222650 BILAT EXTENDED OPHTHALMOSCOPY, F/U

## 2019-05-31 PROCEDURE — 92012 INTRM OPH EXAM EST PATIENT: CPT

## 2019-05-31 PROCEDURE — 92134 CPTRZ OPH DX IMG PST SGM RTA: CPT

## 2019-05-31 ASSESSMENT — TONOMETRY
OS_IOP_MMHG: 11
OD_IOP_MMHG: 12

## 2019-05-31 ASSESSMENT — VISUAL ACUITY
OS_SC: 20/400
OD_SC: 20/40+2

## 2019-06-14 ENCOUNTER — ESTABLISHED COMPREHENSIVE EXAM (OUTPATIENT)
Dept: URBAN - METROPOLITAN AREA CLINIC 46 | Facility: CLINIC | Age: 79
End: 2019-06-14

## 2019-06-14 DIAGNOSIS — H04.123: ICD-10-CM

## 2019-06-14 DIAGNOSIS — H40.1132: ICD-10-CM

## 2019-06-14 DIAGNOSIS — E11.3293: ICD-10-CM

## 2019-06-14 PROCEDURE — 92250 FUNDUS PHOTOGRAPHY W/I&R: CPT

## 2019-06-14 PROCEDURE — 92014 COMPRE OPH EXAM EST PT 1/>: CPT

## 2019-06-14 PROCEDURE — 9222650 BILAT EXTENDED OPHTHALMOSCOPY, F/U

## 2019-06-14 ASSESSMENT — VISUAL ACUITY
OS_CC: <J12
OS_SC: <J12
OD_SC: <J12
OD_SC: 20/25-2
OS_SC: 20/200-1
OD_CC: J1

## 2019-06-14 ASSESSMENT — TONOMETRY
OS_IOP_MMHG: 11
OD_IOP_MMHG: 12

## 2019-06-19 ENCOUNTER — CLINIC PROCEDURE ONLY (OUTPATIENT)
Dept: URBAN - METROPOLITAN AREA CLINIC 43 | Facility: CLINIC | Age: 79
End: 2019-06-19

## 2019-06-19 VITALS — DIASTOLIC BLOOD PRESSURE: 59 MMHG | SYSTOLIC BLOOD PRESSURE: 112 MMHG | HEIGHT: 60 IN | HEART RATE: 61 BPM

## 2019-06-19 DIAGNOSIS — H35.3212: ICD-10-CM

## 2019-06-19 DIAGNOSIS — H40.1132: ICD-10-CM

## 2019-06-19 PROCEDURE — 65800 DRAINAGE OF EYE: CPT

## 2019-06-19 PROCEDURE — 67028 INJECTION EYE DRUG: CPT

## 2019-06-19 PROCEDURE — J3490AVA AVASTIN

## 2019-06-19 ASSESSMENT — VISUAL ACUITY
OS_SC: CF 5FT
OD_SC: 20/25-3

## 2019-06-19 ASSESSMENT — TONOMETRY: OD_IOP_MMHG: 11

## 2019-08-28 ENCOUNTER — CLINIC PROCEDURE ONLY (OUTPATIENT)
Dept: URBAN - METROPOLITAN AREA CLINIC 43 | Facility: CLINIC | Age: 79
End: 2019-08-28

## 2019-08-28 DIAGNOSIS — H40.1132: ICD-10-CM

## 2019-08-28 DIAGNOSIS — H35.3212: ICD-10-CM

## 2019-08-28 PROCEDURE — J3490AVA AVASTIN

## 2019-08-28 PROCEDURE — 67028 INJECTION EYE DRUG: CPT

## 2019-08-28 PROCEDURE — 65800 DRAINAGE OF EYE: CPT

## 2019-08-28 ASSESSMENT — VISUAL ACUITY
OS_SC: 20/200-1
OD_SC: 20/30

## 2019-08-28 ASSESSMENT — TONOMETRY
OS_IOP_MMHG: 12
OD_IOP_MMHG: 12

## 2019-10-24 ENCOUNTER — ESTABLISHED PATIENT (OUTPATIENT)
Dept: URBAN - METROPOLITAN AREA CLINIC 43 | Facility: CLINIC | Age: 79
End: 2019-10-24

## 2019-10-24 DIAGNOSIS — H35.3221: ICD-10-CM

## 2019-10-24 DIAGNOSIS — H35.3212: ICD-10-CM

## 2019-10-24 DIAGNOSIS — E11.3293: ICD-10-CM

## 2019-10-24 DIAGNOSIS — H02.056: ICD-10-CM

## 2019-10-24 DIAGNOSIS — H40.1132: ICD-10-CM

## 2019-10-24 PROCEDURE — 92014 COMPRE OPH EXAM EST PT 1/>: CPT

## 2019-10-24 PROCEDURE — 92134 CPTRZ OPH DX IMG PST SGM RTA: CPT

## 2019-10-24 PROCEDURE — 67820 REVISE EYELASHES: CPT

## 2019-10-24 PROCEDURE — 9222650 BILAT EXTENDED OPHTHALMOSCOPY, F/U

## 2019-10-24 ASSESSMENT — TONOMETRY
OD_IOP_MMHG: 10
OS_IOP_MMHG: 12

## 2019-10-24 ASSESSMENT — VISUAL ACUITY
OS_SC: CF 6FT
OD_SC: 20/30-1

## 2019-11-13 ENCOUNTER — CLINIC PROCEDURE ONLY (OUTPATIENT)
Dept: URBAN - METROPOLITAN AREA CLINIC 43 | Facility: CLINIC | Age: 79
End: 2019-11-13

## 2019-11-13 DIAGNOSIS — H02.056: ICD-10-CM

## 2019-11-13 DIAGNOSIS — H40.1132: ICD-10-CM

## 2019-11-13 DIAGNOSIS — H35.3212: ICD-10-CM

## 2019-11-13 PROCEDURE — 65800 DRAINAGE OF EYE: CPT

## 2019-11-13 PROCEDURE — 67028 INJECTION EYE DRUG: CPT

## 2019-11-13 PROCEDURE — 67820 REVISE EYELASHES: CPT

## 2019-11-13 PROCEDURE — J3490AVA AVASTIN

## 2019-11-13 ASSESSMENT — VISUAL ACUITY
OS_SC: CF 5FT
OD_SC: 20/30

## 2019-11-13 ASSESSMENT — TONOMETRY
OS_IOP_MMHG: 10
OD_IOP_MMHG: 10

## 2019-12-13 ENCOUNTER — IOP CHECK (OUTPATIENT)
Dept: URBAN - METROPOLITAN AREA CLINIC 46 | Facility: CLINIC | Age: 79
End: 2019-12-13

## 2019-12-13 DIAGNOSIS — H40.1132: ICD-10-CM

## 2019-12-13 PROCEDURE — 92133 CPTRZD OPH DX IMG PST SGM ON: CPT

## 2019-12-13 PROCEDURE — 92012 INTRM OPH EXAM EST PATIENT: CPT

## 2019-12-13 ASSESSMENT — TONOMETRY
OD_IOP_MMHG: 12
OS_IOP_MMHG: 11

## 2019-12-13 ASSESSMENT — VISUAL ACUITY: OD_SC: 20/30

## 2020-01-10 ENCOUNTER — ESTABLISHED PATIENT (OUTPATIENT)
Dept: URBAN - METROPOLITAN AREA CLINIC 43 | Facility: CLINIC | Age: 80
End: 2020-01-10

## 2020-01-10 DIAGNOSIS — H35.3221: ICD-10-CM

## 2020-01-10 DIAGNOSIS — E11.3293: ICD-10-CM

## 2020-01-10 DIAGNOSIS — H35.3212: ICD-10-CM

## 2020-01-10 DIAGNOSIS — H43.391: ICD-10-CM

## 2020-01-10 PROCEDURE — 92201 OPSCPY EXTND RTA DRAW UNI/BI: CPT

## 2020-01-10 PROCEDURE — 92014 COMPRE OPH EXAM EST PT 1/>: CPT

## 2020-01-10 PROCEDURE — 92134 CPTRZ OPH DX IMG PST SGM RTA: CPT

## 2020-01-10 ASSESSMENT — TONOMETRY
OD_IOP_MMHG: 11
OS_IOP_MMHG: 12

## 2020-01-10 ASSESSMENT — VISUAL ACUITY
OD_SC: 20/30-2
OS_SC: CF 1FT

## 2020-01-14 ENCOUNTER — ESTABLISHED PATIENT (OUTPATIENT)
Dept: URBAN - METROPOLITAN AREA CLINIC 44 | Facility: CLINIC | Age: 80
End: 2020-01-14

## 2020-01-14 DIAGNOSIS — H02.005: ICD-10-CM

## 2020-01-14 DIAGNOSIS — H02.055: ICD-10-CM

## 2020-01-14 PROCEDURE — 67921 REPAIR EYELID DEFECT: CPT

## 2020-01-14 PROCEDURE — 92285 EXTERNAL OCULAR PHOTOGRAPHY: CPT

## 2020-01-14 PROCEDURE — 99213 OFFICE O/P EST LOW 20 MIN: CPT

## 2020-01-14 RX ORDER — ERYTHROMYCIN 5 MG/G: OINTMENT OPHTHALMIC

## 2020-02-07 ENCOUNTER — CLINIC PROCEDURE ONLY (OUTPATIENT)
Dept: URBAN - METROPOLITAN AREA CLINIC 43 | Facility: CLINIC | Age: 80
End: 2020-02-07

## 2020-02-07 DIAGNOSIS — H35.3212: ICD-10-CM

## 2020-02-07 PROCEDURE — 67028 INJECTION EYE DRUG: CPT

## 2020-02-07 PROCEDURE — J3490AVA AVASTIN

## 2020-02-07 ASSESSMENT — VISUAL ACUITY
OS_SC: CF 2FT
OD_SC: 20/30-2

## 2020-02-07 ASSESSMENT — TONOMETRY
OD_IOP_MMHG: 10
OS_IOP_MMHG: 12

## 2020-05-08 ENCOUNTER — ESTABLISHED PATIENT (OUTPATIENT)
Dept: URBAN - METROPOLITAN AREA CLINIC 43 | Facility: CLINIC | Age: 80
End: 2020-05-08

## 2020-05-08 DIAGNOSIS — H35.3221: ICD-10-CM

## 2020-05-08 DIAGNOSIS — H43.391: ICD-10-CM

## 2020-05-08 DIAGNOSIS — H35.3211: ICD-10-CM

## 2020-05-08 DIAGNOSIS — E11.3293: ICD-10-CM

## 2020-05-08 PROCEDURE — J3490AVA AVASTIN

## 2020-05-08 PROCEDURE — 92201 OPSCPY EXTND RTA DRAW UNI/BI: CPT

## 2020-05-08 PROCEDURE — 67028 INJECTION EYE DRUG: CPT

## 2020-05-08 PROCEDURE — 92134 CPTRZ OPH DX IMG PST SGM RTA: CPT

## 2020-05-08 PROCEDURE — 92014 COMPRE OPH EXAM EST PT 1/>: CPT

## 2020-05-08 ASSESSMENT — TONOMETRY
OS_IOP_MMHG: 10
OD_IOP_MMHG: 10

## 2020-05-08 ASSESSMENT — VISUAL ACUITY
OS_SC: CF 6FT
OD_SC: 20/30

## 2020-07-17 ENCOUNTER — VISUAL FIELD FOLLOW-UP (OUTPATIENT)
Dept: URBAN - METROPOLITAN AREA CLINIC 46 | Facility: CLINIC | Age: 80
End: 2020-07-17

## 2020-07-17 DIAGNOSIS — H04.123: ICD-10-CM

## 2020-07-17 DIAGNOSIS — H43.391: ICD-10-CM

## 2020-07-17 DIAGNOSIS — H40.1132: ICD-10-CM

## 2020-07-17 PROCEDURE — 92012 INTRM OPH EXAM EST PATIENT: CPT

## 2020-07-17 PROCEDURE — 92083 EXTENDED VISUAL FIELD XM: CPT

## 2020-07-17 ASSESSMENT — VISUAL ACUITY
OS_SC: 20/400
OD_SC: 20/30

## 2020-07-17 ASSESSMENT — TONOMETRY
OD_IOP_MMHG: 13
OS_IOP_MMHG: 14

## 2020-07-22 ENCOUNTER — CLINIC PROCEDURE ONLY (OUTPATIENT)
Dept: URBAN - METROPOLITAN AREA CLINIC 43 | Facility: CLINIC | Age: 80
End: 2020-07-22

## 2020-07-22 DIAGNOSIS — H35.3212: ICD-10-CM

## 2020-07-22 PROCEDURE — 67028 INJECTION EYE DRUG: CPT

## 2020-07-22 PROCEDURE — J3490AVA AVASTIN

## 2020-07-22 ASSESSMENT — TONOMETRY
OD_IOP_MMHG: 11
OS_IOP_MMHG: 12

## 2020-07-22 ASSESSMENT — VISUAL ACUITY
OD_SC: 20/40
OS_SC: CF 6FT

## 2020-09-25 ENCOUNTER — ESTABLISHED PATIENT (OUTPATIENT)
Dept: URBAN - METROPOLITAN AREA CLINIC 43 | Facility: CLINIC | Age: 80
End: 2020-09-25

## 2020-09-25 DIAGNOSIS — H35.3221: ICD-10-CM

## 2020-09-25 DIAGNOSIS — E11.3293: ICD-10-CM

## 2020-09-25 DIAGNOSIS — H35.3212: ICD-10-CM

## 2020-09-25 PROCEDURE — 92014 COMPRE OPH EXAM EST PT 1/>: CPT

## 2020-09-25 PROCEDURE — 92202 OPSCPY EXTND ON/MAC DRAW: CPT

## 2020-09-25 PROCEDURE — 92134 CPTRZ OPH DX IMG PST SGM RTA: CPT

## 2020-09-25 ASSESSMENT — TONOMETRY
OS_IOP_MMHG: 14
OD_IOP_MMHG: 13

## 2020-09-25 ASSESSMENT — VISUAL ACUITY
OS_SC: CF 5FT
OD_SC: 20/40+1

## 2020-11-19 ENCOUNTER — ESTABLISHED PATIENT (OUTPATIENT)
Dept: URBAN - METROPOLITAN AREA CLINIC 43 | Facility: CLINIC | Age: 80
End: 2020-11-19

## 2020-11-19 DIAGNOSIS — E11.3293: ICD-10-CM

## 2020-11-19 DIAGNOSIS — H35.3212: ICD-10-CM

## 2020-11-19 DIAGNOSIS — H43.391: ICD-10-CM

## 2020-11-19 DIAGNOSIS — H35.3221: ICD-10-CM

## 2020-11-19 PROCEDURE — J3490AVA AVASTIN

## 2020-11-19 PROCEDURE — 92134 CPTRZ OPH DX IMG PST SGM RTA: CPT

## 2020-11-19 PROCEDURE — 67028 INJECTION EYE DRUG: CPT

## 2020-11-19 PROCEDURE — 92202 OPSCPY EXTND ON/MAC DRAW: CPT

## 2020-11-19 PROCEDURE — 92014 COMPRE OPH EXAM EST PT 1/>: CPT

## 2020-11-19 ASSESSMENT — VISUAL ACUITY
OD_SC: 20/40-2
OS_SC: CF 6FT

## 2020-11-19 ASSESSMENT — TONOMETRY
OS_IOP_MMHG: 13
OD_IOP_MMHG: 13

## 2020-12-30 ENCOUNTER — CLINIC PROCEDURE ONLY (OUTPATIENT)
Dept: URBAN - METROPOLITAN AREA CLINIC 43 | Facility: CLINIC | Age: 80
End: 2020-12-30

## 2020-12-30 VITALS — HEIGHT: 60 IN

## 2020-12-30 DIAGNOSIS — H35.3212: ICD-10-CM

## 2020-12-30 PROCEDURE — J3490AVA AVASTIN

## 2020-12-30 PROCEDURE — 67028 INJECTION EYE DRUG: CPT

## 2020-12-30 ASSESSMENT — TONOMETRY
OS_IOP_MMHG: 13
OD_IOP_MMHG: 11

## 2020-12-30 ASSESSMENT — VISUAL ACUITY
OS_SC: CF 4FT
OD_SC: 20/30-1

## 2021-01-15 ENCOUNTER — PREPPED CHART (OUTPATIENT)
Dept: URBAN - METROPOLITAN AREA CLINIC 46 | Facility: CLINIC | Age: 81
End: 2021-01-15

## 2021-02-11 ENCOUNTER — ESTABLISHED PATIENT (OUTPATIENT)
Dept: URBAN - METROPOLITAN AREA CLINIC 43 | Facility: CLINIC | Age: 81
End: 2021-02-11

## 2021-02-11 DIAGNOSIS — H40.1132: ICD-10-CM

## 2021-02-11 DIAGNOSIS — H43.391: ICD-10-CM

## 2021-02-11 DIAGNOSIS — E11.3293: ICD-10-CM

## 2021-02-11 DIAGNOSIS — H35.3221: ICD-10-CM

## 2021-02-11 DIAGNOSIS — Z96.1: ICD-10-CM

## 2021-02-11 DIAGNOSIS — H35.3211: ICD-10-CM

## 2021-02-11 PROCEDURE — 92134 CPTRZ OPH DX IMG PST SGM RTA: CPT

## 2021-02-11 PROCEDURE — C9257 BEVACIZUMAB INJECTION: HCPCS

## 2021-02-11 PROCEDURE — 67028 INJECTION EYE DRUG: CPT

## 2021-02-11 PROCEDURE — 92012 INTRM OPH EXAM EST PATIENT: CPT

## 2021-02-11 ASSESSMENT — VISUAL ACUITY
OS_SC: CF 6FT
OD_SC: 20/30-1

## 2021-02-11 ASSESSMENT — TONOMETRY
OD_IOP_MMHG: 12
OS_IOP_MMHG: 12

## 2021-02-19 ENCOUNTER — ESTABLISHED COMPREHENSIVE EXAM (OUTPATIENT)
Dept: URBAN - METROPOLITAN AREA CLINIC 46 | Facility: CLINIC | Age: 81
End: 2021-02-19

## 2021-02-19 DIAGNOSIS — H40.1132: ICD-10-CM

## 2021-02-19 PROCEDURE — 92250 FUNDUS PHOTOGRAPHY W/I&R: CPT

## 2021-02-19 PROCEDURE — 92014 COMPRE OPH EXAM EST PT 1/>: CPT

## 2021-02-19 ASSESSMENT — VISUAL ACUITY
OD_CC: J1
OS_CC: >J12
OD_SC: 20/50+2
OS_SC: 20/400

## 2021-02-19 ASSESSMENT — TONOMETRY
OD_IOP_MMHG: 12
OS_IOP_MMHG: 11

## 2021-03-24 ENCOUNTER — CLINIC PROCEDURE ONLY (OUTPATIENT)
Dept: URBAN - METROPOLITAN AREA CLINIC 43 | Facility: CLINIC | Age: 81
End: 2021-03-24

## 2021-03-24 VITALS — HEIGHT: 60 IN

## 2021-03-24 DIAGNOSIS — H40.1132: ICD-10-CM

## 2021-03-24 DIAGNOSIS — H35.3211: ICD-10-CM

## 2021-03-24 PROCEDURE — C9257 BEVACIZUMAB INJECTION: HCPCS

## 2021-03-24 PROCEDURE — 65800 DRAINAGE OF EYE: CPT

## 2021-03-24 PROCEDURE — 67028 INJECTION EYE DRUG: CPT

## 2021-03-24 ASSESSMENT — VISUAL ACUITY
OD_SC: 20/40
OD_PH: 20/30
OS_SC: 20/400

## 2021-03-24 ASSESSMENT — TONOMETRY
OD_IOP_MMHG: 13
OS_IOP_MMHG: 14

## 2021-05-19 ENCOUNTER — ESTABLISHED PATIENT (OUTPATIENT)
Dept: URBAN - METROPOLITAN AREA CLINIC 43 | Facility: CLINIC | Age: 81
End: 2021-05-19

## 2021-05-19 VITALS — HEIGHT: 60 IN

## 2021-05-19 DIAGNOSIS — H35.3211: ICD-10-CM

## 2021-05-19 DIAGNOSIS — Z96.1: ICD-10-CM

## 2021-05-19 DIAGNOSIS — H43.391: ICD-10-CM

## 2021-05-19 DIAGNOSIS — E11.3293: ICD-10-CM

## 2021-05-19 DIAGNOSIS — H35.3221: ICD-10-CM

## 2021-05-19 PROCEDURE — 92134 CPTRZ OPH DX IMG PST SGM RTA: CPT

## 2021-05-19 PROCEDURE — C9257 BEVACIZUMAB INJECTION: HCPCS

## 2021-05-19 PROCEDURE — 65800 DRAINAGE OF EYE: CPT

## 2021-05-19 PROCEDURE — 92202 OPSCPY EXTND ON/MAC DRAW: CPT

## 2021-05-19 PROCEDURE — 67028 INJECTION EYE DRUG: CPT

## 2021-05-19 PROCEDURE — 92014 COMPRE OPH EXAM EST PT 1/>: CPT

## 2021-05-19 ASSESSMENT — TONOMETRY
OD_IOP_MMHG: 13
OS_IOP_MMHG: 14

## 2021-05-19 ASSESSMENT — VISUAL ACUITY
OS_SC: 20/400
OD_SC: 20/30-1

## 2021-06-30 ENCOUNTER — CLINIC PROCEDURE ONLY (OUTPATIENT)
Dept: URBAN - METROPOLITAN AREA CLINIC 43 | Facility: CLINIC | Age: 81
End: 2021-06-30

## 2021-06-30 DIAGNOSIS — H40.1132: ICD-10-CM

## 2021-06-30 DIAGNOSIS — H35.3211: ICD-10-CM

## 2021-06-30 PROCEDURE — 65800 DRAINAGE OF EYE: CPT

## 2021-06-30 PROCEDURE — C9257 BEVACIZUMAB INJECTION: HCPCS

## 2021-06-30 PROCEDURE — 67028 INJECTION EYE DRUG: CPT

## 2021-06-30 ASSESSMENT — VISUAL ACUITY
OS_SC: 20/400
OD_SC: 20/30-2

## 2021-06-30 ASSESSMENT — TONOMETRY
OS_IOP_MMHG: 13
OD_IOP_MMHG: 10

## 2021-08-06 ENCOUNTER — ESTABLISHED PATIENT (OUTPATIENT)
Dept: URBAN - METROPOLITAN AREA CLINIC 43 | Facility: CLINIC | Age: 81
End: 2021-08-06

## 2021-08-06 DIAGNOSIS — H35.3211: ICD-10-CM

## 2021-08-06 DIAGNOSIS — H43.391: ICD-10-CM

## 2021-08-06 DIAGNOSIS — H35.3221: ICD-10-CM

## 2021-08-06 DIAGNOSIS — E11.3293: ICD-10-CM

## 2021-08-06 PROCEDURE — 92202 OPSCPY EXTND ON/MAC DRAW: CPT

## 2021-08-06 PROCEDURE — C9257 BEVACIZUMAB INJECTION: HCPCS

## 2021-08-06 PROCEDURE — 92014 COMPRE OPH EXAM EST PT 1/>: CPT

## 2021-08-06 PROCEDURE — 67028 INJECTION EYE DRUG: CPT

## 2021-08-06 PROCEDURE — 92134 CPTRZ OPH DX IMG PST SGM RTA: CPT

## 2021-08-06 ASSESSMENT — VISUAL ACUITY: OD_SC: 20/25-2

## 2021-08-06 ASSESSMENT — TONOMETRY
OS_IOP_MMHG: 11
OD_IOP_MMHG: 12

## 2021-08-20 ENCOUNTER — DILATED FUNDUS EXAM (OUTPATIENT)
Dept: URBAN - METROPOLITAN AREA CLINIC 46 | Facility: CLINIC | Age: 81
End: 2021-08-20

## 2021-08-20 DIAGNOSIS — H40.1132: ICD-10-CM

## 2021-08-20 PROCEDURE — 92133 CPTRZD OPH DX IMG PST SGM ON: CPT

## 2021-08-20 PROCEDURE — 92012 INTRM OPH EXAM EST PATIENT: CPT

## 2021-08-20 PROCEDURE — 92202 OPSCPY EXTND ON/MAC DRAW: CPT

## 2021-08-20 ASSESSMENT — TONOMETRY
OS_IOP_MMHG: 12
OD_IOP_MMHG: 10

## 2021-08-20 ASSESSMENT — VISUAL ACUITY
OS_SC: 20/400
OD_SC: 20/25-1

## 2021-09-15 ENCOUNTER — CLINIC PROCEDURE ONLY (OUTPATIENT)
Dept: URBAN - METROPOLITAN AREA CLINIC 43 | Facility: CLINIC | Age: 81
End: 2021-09-15

## 2021-09-15 DIAGNOSIS — H35.3211: ICD-10-CM

## 2021-09-15 PROCEDURE — C9257 BEVACIZUMAB INJECTION: HCPCS

## 2021-09-15 PROCEDURE — 67028 INJECTION EYE DRUG: CPT

## 2021-09-15 ASSESSMENT — TONOMETRY
OS_IOP_MMHG: 8
OD_IOP_MMHG: 8

## 2021-09-15 ASSESSMENT — VISUAL ACUITY
OD_PH: 20/30-1
OS_SC: CF 2FT
OD_SC: 20/40+2

## 2021-11-03 ENCOUNTER — ESTABLISHED PATIENT (OUTPATIENT)
Dept: URBAN - METROPOLITAN AREA CLINIC 43 | Facility: CLINIC | Age: 81
End: 2021-11-03

## 2021-11-03 DIAGNOSIS — H35.3211: ICD-10-CM

## 2021-11-03 DIAGNOSIS — H43.391: ICD-10-CM

## 2021-11-03 DIAGNOSIS — E11.3293: ICD-10-CM

## 2021-11-03 DIAGNOSIS — H35.3221: ICD-10-CM

## 2021-11-03 PROCEDURE — 92014 COMPRE OPH EXAM EST PT 1/>: CPT

## 2021-11-03 PROCEDURE — C9257 BEVACIZUMAB INJECTION: HCPCS

## 2021-11-03 PROCEDURE — 67028 INJECTION EYE DRUG: CPT

## 2021-11-03 PROCEDURE — 92134 CPTRZ OPH DX IMG PST SGM RTA: CPT

## 2021-11-03 PROCEDURE — 92202 OPSCPY EXTND ON/MAC DRAW: CPT

## 2021-11-03 ASSESSMENT — TONOMETRY
OD_IOP_MMHG: 14
OS_IOP_MMHG: 13

## 2021-11-03 ASSESSMENT — VISUAL ACUITY
OS_SC: 20/400
OD_SC: 20/40+2

## 2021-12-15 ENCOUNTER — CLINIC PROCEDURE ONLY (OUTPATIENT)
Dept: URBAN - METROPOLITAN AREA CLINIC 43 | Facility: CLINIC | Age: 81
End: 2021-12-15

## 2021-12-15 DIAGNOSIS — H35.3211: ICD-10-CM

## 2021-12-15 PROCEDURE — 67028 INJECTION EYE DRUG: CPT

## 2021-12-15 PROCEDURE — C9257 BEVACIZUMAB INJECTION: HCPCS

## 2021-12-15 ASSESSMENT — VISUAL ACUITY
OD_PH: 20/30
OS_SC: 20/400
OD_SC: 20/40+1

## 2021-12-15 ASSESSMENT — TONOMETRY
OD_IOP_MMHG: 13
OS_IOP_MMHG: 14

## 2022-01-27 ENCOUNTER — ESTABLISHED PATIENT (OUTPATIENT)
Dept: URBAN - METROPOLITAN AREA CLINIC 43 | Facility: CLINIC | Age: 82
End: 2022-01-27

## 2022-01-27 DIAGNOSIS — H35.3221: ICD-10-CM

## 2022-01-27 DIAGNOSIS — H43.391: ICD-10-CM

## 2022-01-27 DIAGNOSIS — E11.3293: ICD-10-CM

## 2022-01-27 DIAGNOSIS — H35.3211: ICD-10-CM

## 2022-01-27 PROCEDURE — 92134 CPTRZ OPH DX IMG PST SGM RTA: CPT

## 2022-01-27 PROCEDURE — 67028 INJECTION EYE DRUG: CPT

## 2022-01-27 PROCEDURE — 92014 COMPRE OPH EXAM EST PT 1/>: CPT

## 2022-01-27 PROCEDURE — 92202 OPSCPY EXTND ON/MAC DRAW: CPT

## 2022-01-27 PROCEDURE — C9257 BEVACIZUMAB INJECTION: HCPCS

## 2022-01-27 ASSESSMENT — VISUAL ACUITY
OD_SC: 20/25
OS_SC: CF 1FT

## 2022-01-27 ASSESSMENT — TONOMETRY
OD_IOP_MMHG: 11
OS_IOP_MMHG: 13

## 2022-02-25 ENCOUNTER — FOLLOW UP (OUTPATIENT)
Dept: URBAN - METROPOLITAN AREA CLINIC 46 | Facility: CLINIC | Age: 82
End: 2022-02-25

## 2022-02-25 DIAGNOSIS — H35.3211: ICD-10-CM

## 2022-02-25 DIAGNOSIS — H35.3221: ICD-10-CM

## 2022-02-25 DIAGNOSIS — H43.391: ICD-10-CM

## 2022-02-25 DIAGNOSIS — H40.1132: ICD-10-CM

## 2022-02-25 PROCEDURE — 92012 INTRM OPH EXAM EST PATIENT: CPT

## 2022-02-25 PROCEDURE — 92083 EXTENDED VISUAL FIELD XM: CPT

## 2022-02-25 ASSESSMENT — VISUAL ACUITY
OS_SC: CF 4FT
OD_SC: 20/30-2

## 2022-02-25 ASSESSMENT — TONOMETRY
OS_IOP_MMHG: 13
OD_IOP_MMHG: 11

## 2022-03-10 ENCOUNTER — CLINIC PROCEDURE ONLY (OUTPATIENT)
Dept: URBAN - METROPOLITAN AREA CLINIC 43 | Facility: CLINIC | Age: 82
End: 2022-03-10

## 2022-03-10 DIAGNOSIS — H35.3211: ICD-10-CM

## 2022-03-10 PROCEDURE — 67028 INJECTION EYE DRUG: CPT

## 2022-03-10 PROCEDURE — C9257 BEVACIZUMAB INJECTION: HCPCS

## 2022-03-10 ASSESSMENT — VISUAL ACUITY
OS_SC: HM @ 2FT
OD_SC: 20/30-2

## 2022-03-10 ASSESSMENT — TONOMETRY
OS_IOP_MMHG: 13
OD_IOP_MMHG: 13

## 2022-04-21 ENCOUNTER — ESTABLISHED PATIENT (OUTPATIENT)
Dept: URBAN - METROPOLITAN AREA CLINIC 43 | Facility: CLINIC | Age: 82
End: 2022-04-21

## 2022-04-21 DIAGNOSIS — H43.391: ICD-10-CM

## 2022-04-21 DIAGNOSIS — H35.3231: ICD-10-CM

## 2022-04-21 DIAGNOSIS — E11.3293: ICD-10-CM

## 2022-04-21 DIAGNOSIS — H40.1132: ICD-10-CM

## 2022-04-21 PROCEDURE — 92134 CPTRZ OPH DX IMG PST SGM RTA: CPT

## 2022-04-21 PROCEDURE — 67028 INJECTION EYE DRUG: CPT

## 2022-04-21 PROCEDURE — 92014 COMPRE OPH EXAM EST PT 1/>: CPT

## 2022-04-21 PROCEDURE — C9257 BEVACIZUMAB INJECTION: HCPCS

## 2022-04-21 PROCEDURE — 92202 OPSCPY EXTND ON/MAC DRAW: CPT

## 2022-04-21 ASSESSMENT — TONOMETRY
OD_IOP_MMHG: 10
OS_IOP_MMHG: 10

## 2022-04-21 ASSESSMENT — VISUAL ACUITY
OD_PH: 20/40-1
OD_SC: 20/50+2

## 2022-06-01 ENCOUNTER — CLINIC PROCEDURE ONLY (OUTPATIENT)
Dept: URBAN - METROPOLITAN AREA CLINIC 43 | Facility: CLINIC | Age: 82
End: 2022-06-01

## 2022-06-01 DIAGNOSIS — H35.3211: ICD-10-CM

## 2022-06-01 PROCEDURE — C9257 BEVACIZUMAB INJECTION: HCPCS

## 2022-06-01 PROCEDURE — 67028 INJECTION EYE DRUG: CPT

## 2022-06-01 ASSESSMENT — VISUAL ACUITY
OD_PH: 20/30-1
OD_SC: 20/40-1

## 2022-06-01 ASSESSMENT — TONOMETRY
OD_IOP_MMHG: 10
OS_IOP_MMHG: 12

## 2022-07-14 ENCOUNTER — ESTABLISHED PATIENT (OUTPATIENT)
Dept: URBAN - METROPOLITAN AREA CLINIC 43 | Facility: CLINIC | Age: 82
End: 2022-07-14

## 2022-07-14 DIAGNOSIS — H35.3211: ICD-10-CM

## 2022-07-14 PROCEDURE — 92134 CPTRZ OPH DX IMG PST SGM RTA: CPT

## 2022-07-14 PROCEDURE — 67028 INJECTION EYE DRUG: CPT

## 2022-07-14 PROCEDURE — C9257 BEVACIZUMAB INJECTION: HCPCS

## 2022-07-14 ASSESSMENT — VISUAL ACUITY: OD_SC: 20/30

## 2022-07-14 ASSESSMENT — TONOMETRY
OS_IOP_MMHG: 15
OD_IOP_MMHG: 15

## 2022-08-19 ENCOUNTER — FOLLOW UP (OUTPATIENT)
Dept: URBAN - METROPOLITAN AREA CLINIC 46 | Facility: CLINIC | Age: 82
End: 2022-08-19

## 2022-08-19 DIAGNOSIS — H43.391: ICD-10-CM

## 2022-08-19 DIAGNOSIS — H40.1132: ICD-10-CM

## 2022-08-19 PROCEDURE — 92133 CPTRZD OPH DX IMG PST SGM ON: CPT

## 2022-08-19 PROCEDURE — 92012 INTRM OPH EXAM EST PATIENT: CPT

## 2022-08-19 PROCEDURE — 92202 OPSCPY EXTND ON/MAC DRAW: CPT

## 2022-08-19 ASSESSMENT — TONOMETRY
OS_IOP_MMHG: 11
OD_IOP_MMHG: 08

## 2022-08-19 ASSESSMENT — VISUAL ACUITY
OD_SC: 20/30-1
OS_SC: CF 4FT EF

## 2022-08-25 ENCOUNTER — ESTABLISHED PATIENT (OUTPATIENT)
Dept: URBAN - METROPOLITAN AREA CLINIC 43 | Facility: CLINIC | Age: 82
End: 2022-08-25

## 2022-08-25 DIAGNOSIS — H35.3211: ICD-10-CM

## 2022-08-25 PROCEDURE — 92134 CPTRZ OPH DX IMG PST SGM RTA: CPT

## 2022-08-25 PROCEDURE — 99213 OFFICE O/P EST LOW 20 MIN: CPT

## 2022-08-25 ASSESSMENT — TONOMETRY
OD_IOP_MMHG: 16
OS_IOP_MMHG: 16

## 2022-08-25 ASSESSMENT — VISUAL ACUITY
OD_SC: 20/25-1
OS_SC: CF 3FT

## 2023-04-28 ENCOUNTER — COMPREHENSIVE EXAM (OUTPATIENT)
Dept: URBAN - METROPOLITAN AREA CLINIC 46 | Facility: CLINIC | Age: 83
End: 2023-04-28

## 2023-04-28 DIAGNOSIS — H40.1132: ICD-10-CM

## 2023-04-28 PROCEDURE — 92250 FUNDUS PHOTOGRAPHY W/I&R: CPT

## 2023-04-28 PROCEDURE — 92014 COMPRE OPH EXAM EST PT 1/>: CPT

## 2023-04-28 ASSESSMENT — VISUAL ACUITY
OD_SC: 20/40
OS_SC: CF 3FT EF
OD_SC: J12

## 2023-04-28 ASSESSMENT — TONOMETRY
OS_IOP_MMHG: 13
OD_IOP_MMHG: 10